# Patient Record
Sex: MALE | Race: WHITE
[De-identification: names, ages, dates, MRNs, and addresses within clinical notes are randomized per-mention and may not be internally consistent; named-entity substitution may affect disease eponyms.]

---

## 2018-03-01 ENCOUNTER — HOSPITAL ENCOUNTER (EMERGENCY)
Dept: HOSPITAL 25 - UCCORT | Age: 23
Discharge: HOME | End: 2018-03-01
Payer: COMMERCIAL

## 2018-03-01 VITALS — SYSTOLIC BLOOD PRESSURE: 153 MMHG | DIASTOLIC BLOOD PRESSURE: 70 MMHG

## 2018-03-01 DIAGNOSIS — Z16.29: ICD-10-CM

## 2018-03-01 DIAGNOSIS — L03.032: Primary | ICD-10-CM

## 2018-03-01 DIAGNOSIS — B95.61: ICD-10-CM

## 2018-03-01 DIAGNOSIS — Z16.11: ICD-10-CM

## 2018-03-01 PROCEDURE — 10060 I&D ABSCESS SIMPLE/SINGLE: CPT

## 2018-03-01 PROCEDURE — 87640 STAPH A DNA AMP PROBE: CPT

## 2018-03-01 PROCEDURE — 87070 CULTURE OTHR SPECIMN AEROBIC: CPT

## 2018-03-01 PROCEDURE — G0463 HOSPITAL OUTPT CLINIC VISIT: HCPCS

## 2018-03-01 PROCEDURE — 87186 SC STD MICRODIL/AGAR DIL: CPT

## 2018-03-01 PROCEDURE — 99212 OFFICE O/P EST SF 10 MIN: CPT

## 2018-03-01 PROCEDURE — 87205 SMEAR GRAM STAIN: CPT

## 2018-03-01 PROCEDURE — 87077 CULTURE AEROBIC IDENTIFY: CPT

## 2018-03-01 PROCEDURE — 87641 MR-STAPH DNA AMP PROBE: CPT

## 2018-03-02 NOTE — UC
- Progress Note


Progress Note: 





RN to call pt.  + staph, sens pending.  Continue antibiotic unless we notify 

otherwise.

## 2025-03-12 NOTE — UC
Shoulder Pain HPI





- HPI Summary


HPI Summary: 





24 yo male with LEFT great toe redness/swelling x 1 week


no trauma











- History of Current Complaint


Chief Complaint: UCLowerExtremity


Stated Complaint: SKIN CONCERN


Time Seen by Provider: 03/01/18 18:01


Hx Obtained From: Patient


Onset/Duration: Gradual Onset, Lasting Days


Timing: Constant


Severity Initially: Mild


Severity Currently: Moderate


Pain Intensity: 8


Pain Scale Used: 0-10 Numeric


Character: Aching, Throbbing


Aggravating Factor(s): Other - touch


Alleviating Factor(s): Rest


Associated Signs And Symptoms: Positive: Swelling, Redness





- Allergies/Home Medications


Allergies/Adverse Reactions: 


 Allergies











Allergy/AdvReac Type Severity Reaction Status Date / Time


 


No Known Allergies Allergy   Verified 03/01/18 17:28














PMH/Surg Hx/FS Hx/Imm Hx


Previously Healthy: Yes





- Surgical History


Surgical History: None





- Family History


Known Family History: Positive: Hypertension, Other - positive FMH for URI





- Social History


Alcohol Use: None


Substance Use Type: None


Smoking Status (MU): Never Smoked Tobacco





Review of Systems


Constitutional: Negative


Skin: Negative


Eyes: Negative


ENT: Negative


Respiratory: Negative


Cardiovascular: Negative


Gastrointestinal: Negative


Genitourinary: Negative


Motor: Negative


Neurovascular: Negative


Musculoskeletal: Negative


Neurological: Negative


Psychological: Negative


Is Patient Immunocompromised?: No


All Other Systems Reviewed And Are Negative: Yes





Physical Exam


Triage Information Reviewed: Yes


Appearance: Well-Appearing, No Pain Distress, Well-Nourished


Vital Signs: 


 Initial Vital Signs











Temp  99.1 F   03/01/18 17:24


 


Pulse  71   03/01/18 17:24


 


Resp  16   03/01/18 17:24


 


BP  153/70   03/01/18 17:24


 


Pulse Ox  97   03/01/18 17:24











Vital Signs Reviewed: Yes


Eyes: Positive: Conjunctiva Clear


ENT: Positive: Hearing grossly normal, Uvula midline.  Negative: Nasal 

congestion, Nasal drainage, Trismus, Muffled voice, Hoarse voice


Neck: Positive: Supple, Nontender, No Lymphadenopathy


Respiratory: Positive: Lungs clear, Normal breath sounds, No respiratory 

distress, No accessory muscle use


Cardiovascular: Positive: RRR, No Murmur


Musculoskeletal: Positive: ROM Intact


Neurological: Positive: Alert


Psychological Exam: Normal


Skin Exam: Other - see image





Procedures





- Procedure Summary


Procedure Summary: 





INCISION AND DRAINAGE of left great toe paronychia





Time out





sterile prep





incised with 18 g needle





3 drops of jaqui pus expressed





culture obtained





sterile dressing applied











- Incision and Drainage


Site: left great toe


Instrument(s): Needle





Shoulder Course/Dx





- Differential Dx/Diagnosis


Provider Diagnoses: left great toe paronychia.  left great toe cellulitis





Discharge





- Discharge Plan


Condition: Stable


Disposition: HOME


Prescriptions: 


Cephalexin CAP* [Keflex CAP*] 500 mg PO QID #28 cap


Patient Education Materials:  Abscess (ED)


Referrals: 


Non Staff,Doctor [Primary Care Provider] - 


Additional Instructions: 


warm soapy soaks 2-4 x day


rest


elevate


recheck for worsening symptoms or if not markedly improved in 3 days





a culture is pending











you BP was high today


may be due to pain





see your MD in the next 2-3 months for recheck





Images


Feet (Multiple View): 


  __________________________














  __________________________





 1 - paronychia .